# Patient Record
Sex: FEMALE | Race: WHITE
[De-identification: names, ages, dates, MRNs, and addresses within clinical notes are randomized per-mention and may not be internally consistent; named-entity substitution may affect disease eponyms.]

---

## 2017-06-19 ENCOUNTER — HOSPITAL ENCOUNTER (EMERGENCY)
Dept: HOSPITAL 25 - ED | Age: 52
Discharge: HOME | End: 2017-06-19
Payer: COMMERCIAL

## 2017-06-19 VITALS — DIASTOLIC BLOOD PRESSURE: 80 MMHG | SYSTOLIC BLOOD PRESSURE: 120 MMHG

## 2017-06-19 DIAGNOSIS — A69.20: ICD-10-CM

## 2017-06-19 DIAGNOSIS — R21: Primary | ICD-10-CM

## 2017-06-19 PROCEDURE — 86618 LYME DISEASE ANTIBODY: CPT

## 2017-06-19 PROCEDURE — 99282 EMERGENCY DEPT VISIT SF MDM: CPT

## 2017-06-23 NOTE — ED
Kam POLLOCK Thomas, scribed for Raul Lal MD on 06/19/17 at 0945 .





Skin Complaint





- HPI Summary


HPI Summary: 





Pt is a 51 y/o female c/o a rash on her R lower back starting 9 days ago. She 

denies seeing a tick on her skin but suspects a tick bite. SHx: no tobacco use, 

no drug use, occasional alcohol use. 








- History of Current Complaint


Chief Complaint: EDGeneral


Time Seen by Provider: 06/19/17 09:30


Stated Complaint: TICK BITE RIGHT HIP


Hx Obtained From: Patient


Onset/Duration: Started Days Ago - at least 9 days ago


Skin Exposure Onset/Duration: Days Ago, Weeks Ago


Timing: Constant, Lasting Days


Current Severity: Mild


Pain Intensity: 3


Pain Scale Used: 0-10 Numeric


Skin Location: Other: - R flank/hip


Aggravating Symptom(s): Nothing


Alleviating Symptom(s): Nothing


Associated Signs & Symptoms: Negative


Related History: Insect Bite/Sting - pt suspects tick bite





- Allergy/Home Medications


Allergies/Adverse Reactions: 


 Allergies











Allergy/AdvReac Type Severity Reaction Status Date / Time


 


Erythromycin AdvReac Mild GI Upset Verified 11/11/15 13:11


 


Tetracycline AdvReac Mild GI Upset Verified 11/11/15 13:11














PMH/Surg Hx/FS Hx/Imm Hx


Previously Healthy: No


Endocrine/Hematology History: 


   Comment Only: Hx Diabetes - pre-diabetic possibly


Respiratory History: Reports: Other Respiratory Problems/Disorders - allergic 

rhinitis


GI History: Reports: Hx Irritable Bowel


 History: Reports: Other  Problems/Disorders - hx childhood urethral 

stricture d/t frequent UTI's


Musculoskeletal History: Reports: Hx Back Problems - recent dx slipped disc, Hx 

Orthopedic Injury - ankle reconstruction, Other Musculoskeletal History - 

chronic neck pain


   Denies: Hx Rheumatoid Arthritis, Hx Osteoporosis


Sensory History: Reports: Hx Hearing Problem


Neurological History: Reports: Hx Headaches, Hx Migraine





- Cancer History


Hx Chemotherapy: No


Hx Radiation Therapy: No





- Surgical History


Surgery Procedure, Year, and Place: TONSILLECTOMY, wisdom teeth.  LEFT ANKLE 

SURGERY FOR BROKEN TALUS.  D&C.  uterine ablation


Infectious Disease History: 


   Denies: History Other Infectious Disease, Traveled Outside the [place] in Last 30 

Days





- Family History


Known Family History: Positive: Other - POS: father brain tumor, mother/brother 

[place], sister thyroid disease





- Social History


Alcohol Use: None


Substance Use Type: Reports: None


Smoking Status (MU): Never Smoked Tobacco





Review of Systems


Constitutional: Negative


Eyes: Negative


ENT: Negative


Cardiovascular: Negative


Respiratory: Negative


Gastrointestinal: Negative


Genitourinary: Negative


Musculoskeletal: Negative


Skin: Negative


Positive: Rash - R hip/flank


Neurological: Negative


Psychological: Normal


All Other Systems Reviewed And Are Negative: Yes





Physical Exam





- Summary


Physical Exam Summary: 


VITAL SIGNS: Reviewed.


GENERAL: Patient is a well developed and nourished female who is lying 

comfortable in the stretcher. Patient is not in any acute respiratory distress.


HEAD AND FACE: Normocephalic


EYES: PERRLA, EOMI x 2.


EARS: Hearing grossly intact.


MOUTH: Oropharynx within normal limits.


NECK: Supple, trachea is midline, no adenopathy, no JVD, no carotid bruit.


CHEST: Symmetric, no tenderness at palpation


LUNGS: Clear to auscultation bilaterally. No wheezing or crackles.


CVS: Regular rate and rhythm, S1 and S2 present, no murmurs or gallops 

appreciated.


ABDOMEN: Soft, non-tender. Bowel sounds are normal. No abdominal abnormal 

pulsations.


EXTREMITIES: Full ROM in all major joints, no edema, no cyanosis or clubbing.


NEURO: Alert and oriented x 3. No acute neurological deficits. Speech is normal 

and follows commands.


SKIN: Dry and warm. On the R hip/flank there is a rash with central clearing. 





Triage Information Reviewed: Yes


Vital Signs On Initial Exam: 


 Initial Vitals











Temp Pulse Resp BP Pulse Ox


 


 97.1 F   66   16   122/85   100 


 


 06/19/17 09:24  06/19/17 09:24  06/19/17 09:24  06/19/17 09:24  06/19/17 09:24











Vital Signs Reviewed: Yes





Diagnostics





- Vital Signs


 Vital Signs











  Temp Pulse Resp BP Pulse Ox


 


 06/19/17 09:24  97.1 F  66  16  122/85  100














- Laboratory


Lab Results: 


 Lab Results











  06/19/17 Range/Units





  09:55 


 


Lyme Disease Serology  Negative  (Negative)  











Lab Statement: Any lab studies that have been ordered have been reviewed, and 

results considered in the medical decision making process.





Course/Dx





- Course


Assessment/Plan: The patient is a 51 y/o female c/o a rash on her R lower back 

starting 9 days ago. She denies seeing a tick on her skin but suspects a tick 

bite. In the ED course, the patient shows that she had erythema with central 

clearing, which has been seen in Lyme disease. Lime titers will be followed by 

PCP in the next couple days because the Lyme titers are a send-out test. In the 

meantime, the patient was started on Cefuroxime 500mg BID for 20 days. This 

medication was chosen because the patient is allergic to tetracycline. The 

patient is hemodynamically stable and alert & oriented x 3.





- Differential Diagnoses - Skin Complaint


Differential Diagnoses: Allergic Reaction, Cellulitis, Drug Rash, Poison Ivy, 

Poison [place], Scabies





- Diagnoses


Provider Diagnoses: 


 Rash, R/o lyme disease








Discharge





- Discharge Plan


Condition: Stable


Disposition: HOME


Prescriptions: 


ceFUROXime TAB(*) [Ceftin  MG(*)] 500 mg PO BID #40 tab


Patient Education Materials:  Acute Rash (ED), Tick Bite (ED)


Referrals: 


Amie Chaudhari MD [Primary Care Provider] - 3 Days (Follow up with PCP.)





The documentation as recorded by the Kam fritz Thomas accurately reflects 

the service I personally performed and the decisions made by Lukasz tate Walter, MD.

## 2019-03-20 ENCOUNTER — HOSPITAL ENCOUNTER (EMERGENCY)
Dept: HOSPITAL 25 - UCEAST | Age: 54
Discharge: HOME | End: 2019-03-20
Payer: COMMERCIAL

## 2019-03-20 VITALS — SYSTOLIC BLOOD PRESSURE: 119 MMHG | DIASTOLIC BLOOD PRESSURE: 75 MMHG

## 2019-03-20 DIAGNOSIS — Z88.5: ICD-10-CM

## 2019-03-20 DIAGNOSIS — E11.9: ICD-10-CM

## 2019-03-20 DIAGNOSIS — K80.42: Primary | ICD-10-CM

## 2019-03-20 DIAGNOSIS — Z88.1: ICD-10-CM

## 2019-03-20 PROCEDURE — 76705 ECHO EXAM OF ABDOMEN: CPT

## 2019-03-20 PROCEDURE — 81003 URINALYSIS AUTO W/O SCOPE: CPT

## 2019-03-20 PROCEDURE — 99212 OFFICE O/P EST SF 10 MIN: CPT

## 2019-03-20 PROCEDURE — G0463 HOSPITAL OUTPT CLINIC VISIT: HCPCS

## 2019-03-20 NOTE — ED
Abdominal Pain/Female





- HPI Summary


HPI Summary: 





54 YO wf p/w right posterior back pain radiating to the flank since this AM, 

pain was so bad that she got on "her all fours" due to such intense pain, does 

not recall relation to food or position.. Pain does NOT radiate to the groin 

and has no h/o renal stones





- History of Current Complaint


Chief Complaint: UCBackPain


Stated Complaint: BACK PAIN


Time Seen by Provider: 03/20/19 15:43


Hx Obtained From: Patient


Hx Last Menstrual Period: 2007 after procedure


Onset/Duration: Sudden Onset, Lasting Hours


Timing: Intermittent Episode Lasting


Severity Initially: Severe


Severity Currently: Mild


Pain Intensity: 4


Allergies/Adverse Reactions: 


 Allergies











Allergy/AdvReac Type Severity Reaction Status Date / Time


 


codeine Allergy Severe GI Upset Verified 03/20/19 17:34


 


erythromycin base Allergy Severe GI Verified 03/20/19 15:30


 


Tetracyclines Allergy Severe GI Upset Verified 03/20/19 15:30











Home Medications: 


 Home Medications





Cholecalciferol (Vitamin D3) [Vitamin D3] 1,000 unit PO DAILY 03/20/19 [History 

Confirmed 03/20/19]











PMH/Surg Hx/FS Hx/Imm Hx


Previously Healthy: Yes


Endocrine/Hematology History: 


   Comment Only: Hx Diabetes - pre-diabetic possibly


Respiratory History: Reports: Other Respiratory Problems/Disorders - allergic 

rhinitis


GI History: Reports: Hx Irritable Bowel


 History: Reports: Other  Problems/Disorders - hx childhood urethral 

stricture d/t frequent UTI's


Musculoskeletal History: Reports: Hx Back Problems - recent dx slipped disc, Hx 

Orthopedic Injury - ankle reconstruction, Other Musculoskeletal History - 

chronic neck pain


   Denies: Hx Rheumatoid Arthritis, Hx Osteoporosis


Sensory History: Reports: Hx Hearing Problem


Neurological History: Reports: Hx Headaches, Hx Migraine





- Cancer History


Hx Chemotherapy: No


Hx Radiation Therapy: No





- Surgical History


Surgery Procedure, Year, and Place: TONSILLECTOMY, wisdom teeth.  LEFT ANKLE 

SURGERY FOR BROKEN TALUS.  D&C.  uterine ablation


Infectious Disease History: No


Infectious Disease History: 


   Denies: History Other Infectious Disease, Traveled Outside the [place] in Last 30 

Days





- Family History


Known Family History: Positive: Other - POS: father brain tumor, mother/brother 

[place], sister thyroid disease





- Social History


Alcohol Use: None


Substance Use Type: Reports: None


Smoking Status (MU): Never Smoked Tobacco





Review of Systems





- ROS Summary


Review of Systems Summary: 


Constitutional: Negative


Skin: Negative 


Eyes: Negative


ENT: Negative


Cardiovascular: Negative


Respiratory: Negative


Gastrointestinal: RUQ and right flank tenderness


Genitourinary: Negative


Musculoskeletal: Negative


Neurological: Negative


Psychological: Normal


All Other Systems Reviewed And Are Negative: Yes





All Other Systems Reviewed And Are Negative: Yes





Physical Exam





- Summary


Physical Exam Summary: 


Triage Information Reviewed: Yes


Appearance: No Pain Distress


Eye Exam: Normal


ENT Exam: Normal


Neck: Supple


Respiratory:  Lungs clear, Normal breath sounds


Cardiovascular: Positive: RRR, S1, S2


Abdominal Exam: RUQ and right flank tenderness


Musculoskeletal Exam: Normal


Neurological Exam: CN 2-12 grossly intact


Psychological Exam: Normal


Skin Exam: Normal








Vital Signs On Initial Exam: 


 Initial Vitals











Temp Pulse Resp BP Pulse Ox


 


 36.6 C   65   17   110/72   98 


 


 03/20/19 15:20  03/20/19 15:20  03/20/19 15:20  03/20/19 15:20  03/20/19 15:20














Diagnostics





- Vital Signs


 Vital Signs











  Temp Pulse Resp BP Pulse Ox


 


 03/20/19 15:20  36.6 C  65  17  110/72  98














- Laboratory


Lab Results: 


 Lab Results











  03/20/19 Range/Units





  15:47 


 


POC Urine Color  Yellow  


 


POC Urine Clarity  Clear  


 


POC Urine pH  7.0  (5-9)  


 


POC Ur Specif Gravity  1.015  (1.010-1.030)  


 


POC Urine Protein  Negative  (Negative)  


 


POC Ur Glucose (UA)  Negative  (Negative)  


 


POC Urine Ketones  Negative  (Negative)  


 


POC Urine Blood  Negative  (Negative)  


 


POC Urine Nitrite  Negative  (Negative)  


 


POC Urine Bilirubin  Negative  (Negative)  


 


POC Urine Urobilinogen  0.2  (Negative)  


 


POC U Leukocyte Esteras  Negative  (Negative)  











Lab Statement: Any lab studies that have been ordered have been reviewed, and 

results considered in the medical decision making process.





Abdominal Pain Fem Course/Dx





- Course


Course Of Treatment: RUQ US- Cholelithiasis and positive sonographic Mina's 

sign. Negative for gallbladder wallthickening or pericholecystic fluid to 

strongly favor acute cholecystitis.  Pt to go to general surgeon to schedule a 

cholecystectomy, currently non-toxic, afebrile, will be on cipro/flagyl just in 

case GBstone gets stuck at the neck causing infection, but advised to go to ED 

if pain returns for eval sooner to go to OR for procedure. Gave # for Dr Castro-

surgeon and Dr Villavicencio GI for f/u





- Diagnoses


Provider Diagnoses: 


 Cholecystitis, acute, Choledocholithiasis with acute cholecystitis








Discharge





- Sign-Out/Discharge


Documenting (check all that apply): Patient Departure


All imaging exams completed and their final reports reviewed: Yes





- Discharge Plan


Condition: Stable


Disposition: HOME


Prescriptions: 


Ciprofloxacin TAB* [Cipro 500 MG TAB*] 500 mg PO BID 10 Days #20 tab


metroNIDAZOLE [Flagyl 500 MG TAB] 500 mg PO TID 10 Days #1 tab


Patient Education Materials:  Cholecystitis (ED), Gallstones (ED)


Referrals: 


Miguel Castro MD [Medical Doctor] - 


Fidencio Villavicencio MD [Medical Doctor] - 


Additional Instructions: 


PLEASE GO TO ED IF INTENSE PAIN RETURNS





- Billing Disposition and Condition


Condition: STABLE


Disposition: Home

## 2019-03-21 ENCOUNTER — HOSPITAL ENCOUNTER (EMERGENCY)
Dept: HOSPITAL 25 - ED | Age: 54
Discharge: HOME | End: 2019-03-21
Payer: COMMERCIAL

## 2019-03-21 VITALS — SYSTOLIC BLOOD PRESSURE: 134 MMHG | DIASTOLIC BLOOD PRESSURE: 68 MMHG

## 2019-03-21 DIAGNOSIS — R11.0: ICD-10-CM

## 2019-03-21 DIAGNOSIS — Z88.5: ICD-10-CM

## 2019-03-21 DIAGNOSIS — K80.50: Primary | ICD-10-CM

## 2019-03-21 DIAGNOSIS — R10.13: ICD-10-CM

## 2019-03-21 DIAGNOSIS — Z88.1: ICD-10-CM

## 2019-03-21 DIAGNOSIS — M25.511: ICD-10-CM

## 2019-03-21 LAB
ALBUMIN SERPL BCG-MCNC: 4.5 G/DL (ref 3.2–5.2)
ALBUMIN/GLOB SERPL: 1.6 {RATIO} (ref 1–3)
ALP SERPL-CCNC: 81 U/L (ref 34–104)
ALT SERPL W P-5'-P-CCNC: 25 U/L (ref 7–52)
ANION GAP SERPL CALC-SCNC: 8 MMOL/L (ref 2–11)
AST SERPL-CCNC: 21 U/L (ref 13–39)
BASOPHILS # BLD AUTO: 0.1 10^3/UL (ref 0–0.2)
BUN SERPL-MCNC: 14 MG/DL (ref 6–24)
BUN/CREAT SERPL: 17.3 (ref 8–20)
CALCIUM SERPL-MCNC: 9.7 MG/DL (ref 8.6–10.3)
CHLORIDE SERPL-SCNC: 107 MMOL/L (ref 101–111)
EOSINOPHIL # BLD AUTO: 0.2 10^3/UL (ref 0–0.6)
GLOBULIN SER CALC-MCNC: 2.9 G/DL (ref 2–4)
GLUCOSE SERPL-MCNC: 95 MG/DL (ref 70–100)
HCO3 SERPL-SCNC: 25 MMOL/L (ref 22–32)
HCT VFR BLD AUTO: 41 % (ref 33–41)
HGB BLD-MCNC: 13.6 G/DL (ref 12–16)
LYMPHOCYTES # BLD AUTO: 1.3 10^3/UL (ref 1–4.8)
MCH RBC QN AUTO: 30 PG (ref 27–31)
MCHC RBC AUTO-ENTMCNC: 33 G/DL (ref 31–36)
MCV RBC AUTO: 90 FL (ref 80–97)
MONOCYTES # BLD AUTO: 0.5 10^3/UL (ref 0–0.8)
NEUTROPHILS # BLD AUTO: 3.6 10^3/UL (ref 1.5–7.7)
NRBC # BLD AUTO: 0 10^3/UL
NRBC BLD QL AUTO: 0
PLATELET # BLD AUTO: 176 10^3/UL (ref 150–450)
POTASSIUM SERPL-SCNC: 3.6 MMOL/L (ref 3.5–5)
PROT SERPL-MCNC: 7.4 G/DL (ref 6.4–8.9)
RBC # BLD AUTO: 4.6 10^6 /UL (ref 3.7–4.87)
SODIUM SERPL-SCNC: 140 MMOL/L (ref 135–145)
TROPONIN I SERPL-MCNC: 0 NG/ML (ref ?–0.04)
WBC # BLD AUTO: 5.6 10^3/UL (ref 3.5–10.8)

## 2019-03-21 PROCEDURE — 80053 COMPREHEN METABOLIC PANEL: CPT

## 2019-03-21 PROCEDURE — 96375 TX/PRO/DX INJ NEW DRUG ADDON: CPT

## 2019-03-21 PROCEDURE — 36415 COLL VENOUS BLD VENIPUNCTURE: CPT

## 2019-03-21 PROCEDURE — 84484 ASSAY OF TROPONIN QUANT: CPT

## 2019-03-21 PROCEDURE — 71046 X-RAY EXAM CHEST 2 VIEWS: CPT

## 2019-03-21 PROCEDURE — 96374 THER/PROPH/DIAG INJ IV PUSH: CPT

## 2019-03-21 PROCEDURE — 81003 URINALYSIS AUTO W/O SCOPE: CPT

## 2019-03-21 PROCEDURE — 86140 C-REACTIVE PROTEIN: CPT

## 2019-03-21 PROCEDURE — 99283 EMERGENCY DEPT VISIT LOW MDM: CPT

## 2019-03-21 PROCEDURE — 83605 ASSAY OF LACTIC ACID: CPT

## 2019-03-21 PROCEDURE — 83690 ASSAY OF LIPASE: CPT

## 2019-03-21 PROCEDURE — 85025 COMPLETE CBC W/AUTO DIFF WBC: CPT

## 2019-03-21 NOTE — ED
Abdominal Pain/Female





- HPI Summary


HPI Summary: 





A 54 y/o female presents to [facility] with a chief complaint of abdominal pain 

radiating to her back since yesterday. She reports that her pain was in her 

flank region but now her pain is radiating to her back and up to her left 

shoulder blade and neck. At triage she rated her pain as a 4/10 in severity. 

The patient went to  yesterday where she had a gallbladder ultrasound that 

showed gallstones. The patient reports that she did not have any bowel 

movements today but denies any urinary symptoms. She reports that yesterday she 

had a granola bar at 10:00 and an apple at 12:00 and that she hasnt eaten yet 

today. Vital signs while in room  HR: 60 bpm, O2 Sat: 98, BP: 97/67.





- History of Current Complaint


Chief Complaint: EDAbdPain


Stated Complaint: GAL BLADDER ISSUES PER PT


Time Seen by Provider: 03/21/19 09:41


Hx Obtained From: Patient


Hx Last Menstrual Period: 2007 after procedure


Onset/Duration: Sudden Onset, Lasting Days, Still Present


Timing: Days


Severity Initially: Moderate


Severity Currently: Moderate


Pain Intensity: 4


Pain Scale Used: 0-10 Numeric


Location: Diffuse


Radiates: Yes


Radiates to: Back, Other - right shoulder blade and neck


Character: Other: - Unable to describe


Aggravating Factor(s): Nothing


Alleviating Factor(s): Nothing


Associated Signs and Symptoms: Positive: Back Pain.  Negative: Fever, Urinary 

Symptoms


Allergies/Adverse Reactions: 


 Allergies











Allergy/AdvReac Type Severity Reaction Status Date / Time


 


codeine Allergy Severe GI Upset Verified 03/21/19 09:15


 


erythromycin base Allergy Severe GI Verified 03/21/19 09:15


 


Tetracyclines Allergy Severe GI Upset Verified 03/21/19 09:15


 


hydrocodone Allergy  Vomiting Verified 03/21/19 09:37


 


oxycodone Allergy  Vomiting Verified 03/21/19 09:37


 


tramadol Allergy  Vomiting Verified 03/21/19 09:37











Home Medications: 


 Home Medications





Ascorbic Acid [Vitamin C] 1,000 mg PO DAILY 03/21/19 [History Confirmed 03/21/19

]


Cholecalciferol TAB* [Vitamin D TAB*] 1,000 units PO DAILY 03/21/19 [History 

Confirmed 03/21/19]


Multivitamin [One-Daily Multi-Vitamin] 1 tab PO DAILY 03/21/19 [History 

Confirmed 03/21/19]


Triamcinolone NASAL SPRAY* [Nasacort Aq Nasal Spray*] 1 spray BOTH NARES DAILY 

PRN 03/21/19 [History Confirmed 03/21/19]











PMH/Surg Hx/FS Hx/Imm Hx


Endocrine/Hematology History: 


   Comment Only: Hx Diabetes - pre-diabetic possibly


Respiratory History: Reports: Other Respiratory Problems/Disorders - allergic 

rhinitis


GI History: Reports: Hx Irritable Bowel


 History: Reports: Other  Problems/Disorders - hx childhood urethral 

stricture d/t frequent UTI's


Musculoskeletal History: Reports: Hx Back Problems - recent dx slipped disc, Hx 

Orthopedic Injury - ankle reconstruction, Other Musculoskeletal History - 

chronic neck pain


   Denies: Hx Rheumatoid Arthritis, Hx Osteoporosis


Sensory History: Reports: Hx Hearing Problem


Neurological History: Reports: Hx Headaches, Hx Migraine





- Cancer History


Hx Chemotherapy: No


Hx Radiation Therapy: No





- Surgical History


Surgery Procedure, Year, and Place: TONSILLECTOMY, wisdom teeth.  LEFT ANKLE 

SURGERY FOR BROKEN TALUS.  D&C.  uterine ablation


Infectious Disease History: No


Infectious Disease History: 


   Denies: History Other Infectious Disease, Traveled Outside the [place] in Last 30 

Days





- Family History


Known Family History: Positive: Other - POS: father brain tumor, mother/brother 

[place], sister thyroid disease





- Social History


Alcohol Use: Rare


Alcohol Amount: wine occasionally


Substance Use Type: Reports: None


Smoking Status (MU): Never Smoked Tobacco





Review of Systems


Negative: Fever


Positive: Abdominal Pain, Other - positive: no BM today


Negative: dysuria, hematuria


Positive: Arthralgia, Myalgia


All Other Systems Reviewed And Are Negative: Yes





Physical Exam





- Summary


Physical Exam Summary: 





Appearance: The patient is well-nourished in no acute distress and in no acute 

pain.


 


Skin: The skin is warm and dry and skin color reflects adequate perfusion.





HEENT: The head is normocephalic and atraumatic. The pupils are equal and 

reactive. The conjunctivae are clear and without drainage. Nares are patent and 

without drainage. Mouth reveals moist mucous membranes and the throat is 

without erythema and exudate. The external ears are intact. The ear canals are 

patent and without drainage. The tympanic membranes are intact.


 


Neck: The neck is supple with full range of motion and non-tender. There are no 

carotid bruits. There is no neck vein distension.


 


Respiratory: Chest is non-tender. Lungs are clear to auscultation and breath 

sounds are symmetrical and equal.


 


Cardiovascular: Heart is regular rate and rhythm. There is no murmur or rub 

auscultated. There is no peripheral edema and pulses are symmetrical and equal.


 


Abdomen: Mild epigastric tenderness. There are normal bowel sounds heard in all 

four quadrants and there is no organomegaly palpated.


 


Musculoskeletal: There is no back tenderness noted. Extremities are non-tender 

with full range of motion. There is good capillary refill. There is no 

peripheral edema or calf tenderness elicited.


 


Neurological: Patient is alert and oriented to person, place and time. The 

patient has symmetrical motor strength in all four extremities. Cranial nerves 

are grossly intact. Deep tendon reflexes are symmetrical and equal in all four 

extremities.


 


Psychiatric: The patient has an appropriate affect and does not exhibit any 

anxiety or depression.


Triage Information Reviewed: Yes


Vital Signs On Initial Exam: 


 Initial Vitals











Temp Pulse Resp BP Pulse Ox


 


 98.7 F   61   16   121/83   100 


 


 03/21/19 09:16  03/21/19 09:16  03/21/19 09:16  03/21/19 09:16  03/21/19 09:16











Vital Signs Reviewed: Yes





Diagnostics





- Vital Signs


 Vital Signs











  Temp Pulse Resp BP Pulse Ox


 


 03/21/19 09:43   66   97/67  99


 


 03/21/19 09:34   61    99


 


 03/21/19 09:16  98.7 F  61  16  121/83  100














- Laboratory


Result Diagrams: 


 03/21/19 10:54





 03/21/19 10:54


Lab Statement: Any lab studies that have been ordered have been reviewed, and 

results considered in the medical decision making process.





- Radiology


  ** CXR


Radiology Interpretation Completed By: Radiologist


Summary of Radiographic Findings: Potential obstructive lung disease; correlate 

with clinical assessment.  No evidence for pneumonia. No evidence for acute 

intrathoracic disease.  ED physician has reviewed this imaging report.





Abdominal Pain Fem Course/Dx





- Course


Course Of Treatment: Ms. Medina started with right posterior shoulder pain 

yesterday.  She was nauseated and unable to eat.  When she did eat the pain was 

worsened.  She went to the [facility] where an ultrasound was 

performed which showed gallstones only.  They recommended she come to the 

emergency department to have her gallbladder out.  She went home and during the 

night her pain worsened and went to the left shoulder posterior.  She continued 

with nausea.  When I saw her vitals are stable and she was nontoxic in 

appearance.  She was tender in the epigastrium.  Her laboratory work was 

unremarkable.  I consulted with the surgical service and she was evaluated.  

She has been scheduled for elective cholecystectomy tomorrow.





- Diagnoses


Provider Diagnoses: 


 Biliary colic








Discharge





- Sign-Out/Discharge


Documenting (check all that apply): Patient Departure - DC


Patient Received Moderate/Deep Sedation with Procedure: No





- Discharge Plan


Condition: Stable


Disposition: HOME


Patient Education Materials:  Biliary Colic (ED), Clear Liquid Diet (ED)


Referrals: 


Amie Chaudhari MD [Primary Care Provider] - 


Additional Instructions: 


Use Clear liquid diet, NPO diet. Arrive on same day for surgery at 6am. 


Return to the ED if you experience any new or worsening symptoms.





- Billing Disposition and Condition


Condition: STABLE


Disposition: Home





- Attestation Statements


Document Initiated by Alex: Yes


Documenting Scribe: Oswaldo Coello


Provider For Whom Alex is Documenting (Include Credential): Miguel Watson MD


Scribe Attestation: 


I, Oswaldo Coello, scribed for Miguel Watson MD on 03/21/19 at 1524. 


Scribe Documentation Reviewed: Yes


Provider Attestation: 


The documentation as recorded by the Oswaldo fritz accurately 

reflects the service I personally performed and the decisions made by me, 

Miguel Watson MD


Status of Scribe Document: Viewed

## 2019-03-21 NOTE — HP
CC:  Dr. Amie Chaudhari*

 

DATE OF ADMISSION:  03/22/2019.

 

This patient was seen on Thursday, March 21, 2019 in the [facility] 
Emergency Department.

 

ATTENDING SURGEON:  Dr. Tam Cope* (dictated by Bethany Burnhma NP).

 

CHIEF COMPLAINT:  Worsening abdominal pain.

 

HISTORY OF PRESENT ILLNESS:  The patient is a 53-year-old female who reports 
the onset of right back pain yesterday morning around 10:00 a.m.  She was doing 
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  She states that she ate an apple for 
lunch and then at 1:30 p.m. the pain escalated and she was "on her hands and 
knees with pain in the right side of her back."  She went to the [facility] and states that she had a gallbladder ultrasound done which 
revealed gallstones.  She was prescribed Cipro and Flagyl and advised to have 
follow-up either at [facility] or the [facility] 
Emergency Department.  She reports a very restless night with pain now also in 
the epigastric region associated with some nausea, but no vomiting.  She denied 
any dysuria and had a semi-formed brown stool yesterday.  She also had left-
sided back pain.  For the past three weeks she reports frequent heartburn and 
has been taking probiotics.  Spicy foods have caused heartburn in the past.  
She has had no previous similar attacks of back or abdominal pain.  She states 
that she has felt chills at home and her temperature was 98.8, but she states 
she typically runs a much lower temperature.  Gallbladder ultrasound was 
reviewed which did reveal cholelithiasis with a dominant 2 cm stone. There was 
no gallbladder wall thickening, ductal dilatation, or pericholecystic fluid.  
On the gallbladder ultrasound report, during the exam, apparently she had a 
positive Mina sign.  Here in the emergency department, her white blood cell 
count is normal at 5.8.  Her liver profile is all within normal limits with a 
total bilirubin of 0.60 and a lipase of 15.  She has received two doses of 
Demerol for pain and at present is comfortable.  She has not had any nausea or 
vomiting here in the emergency department.

 

She last ate solid food at 7:00 p.m. last evening which was one piece of chicken
, today at 7:00 a.m. she had water and a few crackers, and at 8:00 a.m. she had 
water.

 

PAST MEDICAL HISTORY:  Generally healthy.  No acute or chronic conditions.  She 
is followed for primary care by Dr. Amie Chaudhari.

 

PAST SURGICAL HISTORY:  Tonsillectomy in childhood and repair of left ankle 
fracture.

 

OB HISTORY:  Status post uterine ablation in 2000 and she has not had a 
menstrual period since 2007.

 

CURRENT MEDICATIONS:

1.  Multivitamin.

2.  Vitamin C supplement.

3.  Vitamin D3 supplement.

 

ALLERGIES:  TETRACYCLINE AND ERYTHROMYCIN CAUSED GI UPSET.  TRAMADOL, 
HYDROCODONE, AND OXYCODONE ALL CAUSE SEVERE VOMITING.  She did tolerate Demerol 
in the emergency department.

 

FAMILY HISTORY:  The patient's sister had a cholecystectomy in her early 50s.  
No known anesthesia complications, bleeding tendencies, or clotting disorders 
in the family.

 

SOCIAL HISTORY:  She is  and her  accompanies her in the 
emergency department today.  She is a nonsmoker and denies the use of alcohol 
or other substances.  She is self-employed as a .

 

REVIEW OF SYSTEMS:  Constitutional:  No fever, occasional chills at home last 
night, no excessive fatigue. General:  No history of anesthesia complications 
or bleeding tendencies.  No history of deep vein thrombosis or pulmonary 
embolism. Endocrine:  No diabetes or thyroid disease.  Respiratory:  No dyspnea 
on exertion, no chronic cough.  Cardiovascular:  No anginal chest pain, no 
palpitations. Typically is very active with good exercise tolerance.  
Gastrointestinal:  As described in history of present illness.  Genitourinary: 
No dysuria, no dark urine, no history of kidney stones, no recent urinary tract 
infection.  Musculoskeletal: No chronic joint or back pain. Neurologic:  Status 
post concussion in 2013 when she fell off of a horse.  No history of seizures.

 

                               PHYSICAL EXAMINATION

 

GENERAL:  The patient is a 53-year-old female, well-developed, well-nourished, 
in no acute distress.

 

VITAL SIGNS:  Height 5'2", weight 155 pounds, body mass index 28.  Blood 
pressure 100/66, pulse 55 and regular, respiratory rate 18, temperature 98, O2 
saturation on room air 99 percent.

 

SKIN:  Warm, dry.  No jaundice.

 

HEENT:  Benign.  Anicteric sclerae.

 

NECK:  Supple.  No cervical lymphadenopathy.

 

LUNGS:  Breath sounds bilaterally clear and equal.

 

HEART:  Regular rate and rhythm.  No murmurs or rubs appreciated.

 

ABDOMEN:  Active bowel sounds.  Soft and nondistended.  She is most tender in 
the epigastrium, but there is no guarding.  Negative Mina sign.  No obvious 
masses, no organomegaly.

 

EXTREMITIES:  Full range of motion.  No edema or skin ulcerations.

 

PELVIC:  Exam deferred.

 

RECTAL:  Exam deferred.

 

NEUROLOGIC:  Alert and oriented times three.

 

 IMPRESSION:  Symptomatic cholelithiasis.

 

PLAN:  As discussed with Dr. Travis who was here in the hospital and also 
evaluated the patient.  She will be discharged to home today on clear liquids.  
She will be NPO after midnight and she will return to the hospital tomorrow 
morning at 6:00 a.m. for a laparoscopic cholecystectomy.  The nature of the 
surgical procedure was described and the typical postoperative recovery was 
described.  The patient is in agreement with the plan and the plan was 
communicated to Dr. Miguel Watson in the emergency department.  She was in 
stable condition and pain free.

 

TIME SPENT:  75 minutes, with greater than 50% in history taking and 
coordination of care.



 ____________________________________ 

OSMANY BURNHAM, GANGA

 

[id number]/CPS #: [id number]

TYLER

## 2019-03-22 ENCOUNTER — HOSPITAL ENCOUNTER (OUTPATIENT)
Dept: HOSPITAL 25 - OR | Age: 54
Discharge: HOME | End: 2019-03-22
Attending: SURGERY
Payer: COMMERCIAL

## 2019-03-22 VITALS — DIASTOLIC BLOOD PRESSURE: 69 MMHG | SYSTOLIC BLOOD PRESSURE: 106 MMHG

## 2019-03-22 DIAGNOSIS — K80.10: Primary | ICD-10-CM

## 2019-03-22 PROCEDURE — 88304 TISSUE EXAM BY PATHOLOGIST: CPT

## 2019-03-22 NOTE — OP
Operative Report - Blank





- Operative Report


Date of Operation: 03/22/19


Note: 





Brief Operative Note


Preop Dx: biliary colic; cholelithiasis


Postop Dx: same


Procedure: laparoscopic cholecystectomy


Anesthesia: GET


Surgeon: Anatoliy


Assistant: CRISTINA Escobedo; JANETT Spain


Fluids: 1300 ml RL


EBL: < 10 ml


Specimen: gallbladder


Drains: none


Findings: dictated

## 2019-03-26 NOTE — OP
CC:  Amie Chaudhari MD

 

OPERATIVE REPORT:

 

DATE OF OPERATION:  03/22/19

 

DATE OF BIRTH:  [birth year]

 

SURGEON:  Tam Cope MD

 

ASSISTANT:  CRISTINA Mckeon

 

ANESTHESIOLOGIST:  Cleo Guadarrama MD

 

ANESTHESIA:  General endotracheal.

 

PRE-OP DIAGNOSES:  Biliary colic and cholelithiasis.

 

POST-OP DIAGNOSES:  Biliary colic and cholelithiasis.

 

OPERATIVE PROCEDURE:  Laparoscopic cholecystectomy.

 

ESTIMATED BLOOD LOSS:  Less than 10 mL.

 

IV FLUIDS:  1.3 L of crystalloid.

 

SPECIMEN:  Gallbladder.

 

DRAINS:  None.

 

COMPLICATIONS:  None.

 

COUNTS:  The instrument, needle, and sponge counts are correct.

 

DESCRIPTION OF PROCEDURE:  The patient was brought to the operating room and placed on the table supi
ne.  Sequential compression devices were placed on both lower extremities.  General anesthesia was ad
ministered.  She was positioned and padded appropriately and received appropriate intravenous antibio
tics.  She was prepped and draped in the usual sterile fashion, then time-out was performed.

 

Local anesthetic was infiltrated into the skin and soft tissue prior to making each incision.  Entry 
into the abdomen was through a transumbilical incision using an open technique.  After accessing the 
peritoneal cavity, carbon dioxide was insufflated to a pressure of 15 mmHg through a 12-mm trocar.  U
nder direct visualization, 5-mm trocars were placed 1 in the subxiphoid position and 2 in the right u
pper quadrant.  The gallbladder was identified.  The fundus was grasped and retracted cephalad.  This
 exposed the infundibulum, which was able to be grasped and retracted.  The peritoneum investing the 
gallbladder was incised, dissected free and the dissection proceeded on the medial and lateral aspect
s of the cystic duct to identify it and also to identify the cystic artery.  After obtaining critical
 view, the 2 structures were doubly clipped and divided.  The gallbladder was freed from the attachme
nts to the liver using the cautery and staying in the avascular plane.  Once the gallbladder was free
d, it was placed into an endoscopic retrieval bag and retrieved through the umbilical site.  Hemostas
is was assured and clips were noted to be intact.  Ports removed under direct visualization.  Carbon 
dioxide was released.  Umbilicus was closed with 0 Vicryl in figure-of-eight fashion to approximate t
he fascia.  Skin incisions were closed with 4-0 Monocryl in subcuticular fashion.  The Steri-Strips w
ere applied.  The patient tolerated the procedure well, was extubated uneventfully and transferred to
 recovery room in stable condition.

 

 [id number]/CPS #: [id number]